# Patient Record
Sex: FEMALE | Race: WHITE | NOT HISPANIC OR LATINO | URBAN - METROPOLITAN AREA
[De-identification: names, ages, dates, MRNs, and addresses within clinical notes are randomized per-mention and may not be internally consistent; named-entity substitution may affect disease eponyms.]

---

## 2022-04-04 ENCOUNTER — OFFICE (OUTPATIENT)
Dept: URBAN - METROPOLITAN AREA CLINIC 72 | Facility: CLINIC | Age: 87
End: 2022-04-04
Payer: MEDICARE

## 2022-04-04 VITALS
HEART RATE: 78 BPM | DIASTOLIC BLOOD PRESSURE: 78 MMHG | HEIGHT: 63 IN | WEIGHT: 198 LBS | OXYGEN SATURATION: 99 % | SYSTOLIC BLOOD PRESSURE: 120 MMHG

## 2022-04-04 DIAGNOSIS — R15.1 FECAL SMEARING: ICD-10-CM

## 2022-04-04 DIAGNOSIS — R93.2 ABNORMAL FINDINGS ON DIAGNOSTIC IMAGING OF LIVER AND BILIARY: ICD-10-CM

## 2022-04-04 DIAGNOSIS — K76.0 FATTY (CHANGE OF) LIVER, NOT ELSEWHERE CLASSIFIED: ICD-10-CM

## 2022-04-04 DIAGNOSIS — K21.9 GASTRO-ESOPHAGEAL REFLUX DISEASE WITHOUT ESOPHAGITIS: ICD-10-CM

## 2022-04-04 DIAGNOSIS — R10.11 RIGHT UPPER QUADRANT PAIN: ICD-10-CM

## 2022-04-04 PROCEDURE — 99214 OFFICE O/P EST MOD 30 MIN: CPT | Performed by: NURSE PRACTITIONER

## 2022-04-04 NOTE — SERVICEHPINOTES
Nadeen Jaquez   is seen today for a follow-up visit.     Pleasant 86-year-old followed by Dr Rogers for GERD, history of Pop's, history of esophageal stricture, personal history of colon polyps and fatty liver.
br   She was last seen 7/2020.
br
br She returns today, 4/4/2022
br PCP referral states right upper quadrant pain and recent ultrasound showing possible cirrhosis with hepatic function panel.  she states the pain has been there for about 2 years.  Right upper quadrant and epigastric.  She also has early satiety and feels very full.  She has tight bandlike feeling around her upper abdomen.  She then gets sharp right upper quadrant pain that is severe but only last a few seconds this occurs every 2-3 weeks without trigger.  She's had 1 episode of vomiting.  She denies nausea, heartburn, dysphagia.  Her bowels have not changed.  She goes once a day after eating.  It can be thin and.  She does have some leakage of stool and mucus at times and her pain ease.  She denies weight loss or signs of GI bleeding.br
br
br
ProceduresbrEGD 11/17- Neg for Pop'sbr11/16- benign esophageal stricturebr8/15- Pop's esophagus without dysplasiabr5/09- esophagitis without Pop's
br
colonoscopy–8/15–normal no recurrent polyps
br
br
Imaging
br 
3/18/2022–abdominal ultrasound completed- coarsened hepatic echotexture, mildly surface nodularity raising concern for cirrhosis, gallbladder surgically absent, no biliary dilatation, spleen normal, no ascitesbr
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
br
brLabs 2/2022 results forthcoming

## 2022-05-19 ENCOUNTER — OFFICE (OUTPATIENT)
Dept: URBAN - METROPOLITAN AREA CLINIC 72 | Facility: CLINIC | Age: 87
End: 2022-05-19

## 2022-05-19 VITALS
HEART RATE: 92 BPM | HEIGHT: 63 IN | SYSTOLIC BLOOD PRESSURE: 136 MMHG | DIASTOLIC BLOOD PRESSURE: 78 MMHG | WEIGHT: 196 LBS

## 2022-05-19 DIAGNOSIS — R68.81 EARLY SATIETY: ICD-10-CM

## 2022-05-19 DIAGNOSIS — K21.9 GASTRO-ESOPHAGEAL REFLUX DISEASE WITHOUT ESOPHAGITIS: ICD-10-CM

## 2022-05-19 DIAGNOSIS — K76.0 FATTY (CHANGE OF) LIVER, NOT ELSEWHERE CLASSIFIED: ICD-10-CM

## 2022-05-19 PROCEDURE — 99214 OFFICE O/P EST MOD 30 MIN: CPT | Performed by: NURSE PRACTITIONER

## 2022-09-14 ENCOUNTER — OFFICE (OUTPATIENT)
Dept: URBAN - METROPOLITAN AREA CLINIC 72 | Facility: CLINIC | Age: 87
End: 2022-09-14

## 2022-09-14 VITALS
WEIGHT: 191 LBS | SYSTOLIC BLOOD PRESSURE: 132 MMHG | HEART RATE: 83 BPM | DIASTOLIC BLOOD PRESSURE: 84 MMHG | RESPIRATION RATE: 14 BRPM | HEIGHT: 61 IN

## 2022-09-14 DIAGNOSIS — R68.81 EARLY SATIETY: ICD-10-CM

## 2022-09-14 DIAGNOSIS — K21.9 GASTRO-ESOPHAGEAL REFLUX DISEASE WITHOUT ESOPHAGITIS: ICD-10-CM

## 2022-09-14 PROCEDURE — 99214 OFFICE O/P EST MOD 30 MIN: CPT | Performed by: NURSE PRACTITIONER

## 2022-09-14 NOTE — SERVICEHPINOTES
Nadeen Jaquez   is seen today for a follow-up visit.    
br
br Pleasant 86-year-old followed by Dr Rogers for GERD, history of Pop's, history of esophageal stricture, personal history of colon polyps and fatty liver..She was seen, 4/4/2022brPCP referral states right upper quadrant pain and recent ultrasound showing possible cirrhosis with hepatic function panel. she states the pain has been there for about 2 years. Right upper quadrant and epigastric. She also has early satiety and feels very full. She has tight bandlike feeling around her upper abdomen. She then gets sharp right upper quadrant pain that is severe but only last a few seconds this occurs every 2-3 weeks without trigger. She's had 1 episode of vomiting. She denies nausea, heartburn, dysphagia. Her bowels have not changed. She goes once a day after eating. It can be thin and. She does have some leakage of stool and mucus at times and her pain ease. She denies weight loss or signs of GI bleeding.brPlanbr- FibroScanbr- AFP (Alpha Feto Protein)br- PT/INR(66981)br- CBC w/auto diffbr- FibroSURE, NASHbr- Hepatitis A Ab IgGbr- Hepatitis B Surface Ab(01696)br- Hepatitis B Surface Antigen(80677)br- Hepatitis C Antibody(54348)br- CT Scan Abdomen with contrast with oral and IV contrastInez was last seen 5/19/2022br she's not having any further sharp pains. Her main complaint she eats and she feels like she's had 3 meals. she tells tight in her epigastric area like a band. She denies nausea or vomiting. She denies dysphagia.
br plan
br - possible gastroparesis, recommend a gastric emptying study, she declined
br -  eat 5-6 small low residue meals daily
br - continue Nexium 40 mg daily
br
br She returns today, 9/14/2022
br She developed acute respiratory failure pneumonia and was hospitalized in June for 28 days.  She is weaning off oxygen now.  She had a CT scan of her chest that showed herniation of peritoneal fat and omentum anterior diaphragmatic hernia involving the short segment of the transverse colon.  She did see a thoracic surgeon Dr. Mcmillan who is monitoring.
br   Overall from a GI standpoint she's doing fairly well.She has still some fullness when she eats.  She has occasional right upper quadrant sharp pain.  She denies heartburn, nausea, vomiting, bowel changes.  No signs of GI bleeding.ProceduresbrEGD 11/17- Neg for Pop'sbr11/16- benign esophageal stricturebr8/15- Pop's esophagus without dysplasiabr5/09- esophagitis without Pop'sbrcolonoscopy–8/15–normal no recurrent polypsImaging
br 7/2022–CT chest without contrast–anterior diaphragmatic hernia involving short segment of the transverse colon, small hiatal hernia, cardiomegalybr5/4/2022-abdomen and pelvis contrasted–scattered probable benign hepatic and renal cyst, mildly prominent bilateral inguinal lymph nodes likely reactive, small sliding hiatal hernia, mild diverticulosis negative for diverticulitis, cholecystectomy and hysterectomy, early pancreatic atrophybr3/18/2022–abdominal ultrasound completed- coarsened hepatic echotexture, mildly surface nodularity raising concern for cirrhosis, gallbladder surgically absent, no biliary dilatation, spleen normal, no odwlwgesj31/2017–ultrasound–fatty liver, loss of corticomedullary differentiation in the left kidneyLabsbr4/2022- Fibrosure S0-1, F0,, WBC 6, hemoglobin 12.8, platelet 201, alpha-fetoprotein 1.6, INR 1.0, hepatitis A antibody total positive, hepatitis B surface antibody and antigen negative, hepatitis C antibody negativebr2/2022 -lipase 19, amylase 53, TSH 3.06, WBC 6.4, Hgb 13, plt 207, TB 0.5, , AST 20, ALT 17br visited="true"

## 2023-08-14 ENCOUNTER — OFFICE (OUTPATIENT)
Dept: URBAN - METROPOLITAN AREA CLINIC 84 | Facility: CLINIC | Age: 88
End: 2023-08-14

## 2023-08-14 VITALS
SYSTOLIC BLOOD PRESSURE: 134 MMHG | DIASTOLIC BLOOD PRESSURE: 74 MMHG | HEART RATE: 59 BPM | HEIGHT: 61 IN | WEIGHT: 186.6 LBS | OXYGEN SATURATION: 96 %

## 2023-08-14 DIAGNOSIS — R15.9 FULL INCONTINENCE OF FECES: ICD-10-CM

## 2023-08-14 DIAGNOSIS — R15.1 FECAL SMEARING: ICD-10-CM

## 2023-08-14 DIAGNOSIS — K21.00 GASTRO-ESOPHAGEAL REFLUX DISEASE WITH ESOPHAGITIS, WITHOUT B: ICD-10-CM

## 2023-08-14 PROCEDURE — 99214 OFFICE O/P EST MOD 30 MIN: CPT | Performed by: NURSE PRACTITIONER

## 2024-05-10 ENCOUNTER — APPOINTMENT (OUTPATIENT)
Dept: URBAN - METROPOLITAN AREA SURGERY 11 | Age: 89
Setting detail: DERMATOLOGY
End: 2024-05-13

## 2024-05-10 DIAGNOSIS — L30.9 DERMATITIS, UNSPECIFIED: ICD-10-CM

## 2024-05-10 PROCEDURE — OTHER OTC TREATMENT REGIMEN: OTHER

## 2024-05-10 PROCEDURE — 99202 OFFICE O/P NEW SF 15 MIN: CPT

## 2024-05-10 PROCEDURE — OTHER COUNSELING: OTHER

## 2024-05-10 ASSESSMENT — LOCATION DETAILED DESCRIPTION DERM
LOCATION DETAILED: LEFT INFERIOR MEDIAL MALAR CHEEK
LOCATION DETAILED: RIGHT INFERIOR CENTRAL MALAR CHEEK

## 2024-05-10 ASSESSMENT — LOCATION ZONE DERM: LOCATION ZONE: FACE

## 2024-05-10 ASSESSMENT — LOCATION SIMPLE DESCRIPTION DERM
LOCATION SIMPLE: RIGHT CHEEK
LOCATION SIMPLE: LEFT CHEEK

## 2024-05-10 NOTE — PROCEDURE: OTC TREATMENT REGIMEN
Samples Given: Samples of cerave and Cetaphil given
Detail Level: Zone
Patient Specific Otc Recommendations (Will Not Stick From Patient To Patient): Rec bland skin care regimen. Adv pt to d/c current topicals rec aquaphor QD